# Patient Record
Sex: FEMALE | Race: WHITE
[De-identification: names, ages, dates, MRNs, and addresses within clinical notes are randomized per-mention and may not be internally consistent; named-entity substitution may affect disease eponyms.]

---

## 2015-03-05 VITALS
SYSTOLIC BLOOD PRESSURE: 103 MMHG | SYSTOLIC BLOOD PRESSURE: 103 MMHG | DIASTOLIC BLOOD PRESSURE: 63 MMHG | DIASTOLIC BLOOD PRESSURE: 63 MMHG | DIASTOLIC BLOOD PRESSURE: 63 MMHG | SYSTOLIC BLOOD PRESSURE: 103 MMHG | SYSTOLIC BLOOD PRESSURE: 103 MMHG | DIASTOLIC BLOOD PRESSURE: 63 MMHG

## 2017-04-06 ENCOUNTER — HOSPITAL ENCOUNTER (OUTPATIENT)
Dept: HOSPITAL 61 - KCIC CT | Age: 52
Discharge: HOME | End: 2017-04-06
Attending: NURSE PRACTITIONER
Payer: OTHER GOVERNMENT

## 2017-04-06 DIAGNOSIS — R19.4: Primary | ICD-10-CM

## 2017-04-06 DIAGNOSIS — Z85.048: ICD-10-CM

## 2017-04-06 PROCEDURE — 74178 CT ABD&PLV WO CNTR FLWD CNTR: CPT

## 2017-04-06 NOTE — [facility]
PROCEDURE 

CT abdomen pelvis with and without contrast. 

 

HISTORY 

History of rectal cancer, bowel changes. Cramping pain. 

 

TECHNIQUE 

Helical CT imaging of the abdomen and pelvis is performed after oral 

contrast and before and after 89 cc Omnipaque 300 IV contrast. 

PQRS: One or more the following individualized dose reduction techniques 

were utilized for the study: 

1. Automated exposure control. 

2. Adjustment of the mA and/or kV according to patient size. 

3. Use of iterative reconstruction technique. 

 

COMPARISON 

CT chest abdomen pelvis with contrast, August 29, 2016. 

 

FINDINGS 

Lung bases are clear. Cardiac size normal. 

 

Small hypodensity in segment 3 of the liver is stable and may be a cyst or

hemangioma. Tiny hypodensity in segment 4B is slightly larger although 

this may be due to slice acquisition. Tiny hypodensity in segment 5 

adjacent to the gallbladder is stable. Liver otherwise homogeneous. 

 

The spleen, gallbladder, pancreas, adrenal glands, abdominal aorta, and 

kidneys are normal. 

 

Stomach unremarkable. No dilated small bowel. No colon wall thickening is 

seen. The appendix diameter is prominent but similar to prior study. There

is no surrounding inflammation. No abdominal adenopathy or free fluid. 

 

Urinary bladder is normal. Uterus unremarkable. Prominent left periuterine

vessels are similar. No pelvic free fluid. 

No acute bone abnormality. 

 

IMPRESSION 

No acute abdominal or pelvic abnormality. 

 

 

Electronically signed by: Chris Wright MD (Apr 06, 2017 14:43:59)

## 2018-12-17 ENCOUNTER — HOSPITAL ENCOUNTER (OUTPATIENT)
Dept: HOSPITAL 61 - CT | Age: 53
Discharge: HOME | End: 2018-12-17
Attending: RADIOLOGY
Payer: OTHER GOVERNMENT

## 2018-12-17 DIAGNOSIS — Z85.828: ICD-10-CM

## 2018-12-17 DIAGNOSIS — K76.89: ICD-10-CM

## 2018-12-17 DIAGNOSIS — J43.2: Primary | ICD-10-CM

## 2018-12-17 PROCEDURE — 74177 CT ABD & PELVIS W/CONTRAST: CPT

## 2018-12-17 PROCEDURE — 71260 CT THORAX DX C+: CPT

## 2018-12-17 NOTE — RAD
CT CHEST ABD PELVIS W/CONTRAST 

 

Indication: Squamous cell cancer

 

Technique: Postcontrast CT imaging was performed of the chest, abdomen, 

pelvis, multiplanar reconstruction images submitted. One or more of the 

following individualized dose reduction techniques were utilized for this 

examination:  1. Automated exposure control  2. Adjustment of the mA 

and/or kV according to patient size  3. Use of iterative reconstruction 

technique.

 

Comparison: August 29, 2016 chest abdomen pelvis and April 6, 2017 exam of

the abdomen and pelvis

 

CHEST:

 

Findings:  

Thoracic aortic caliber is stable, no dissection flap. There are bilateral

breast implants. There is no pericardial or pleural fluid, pneumothorax, 

infiltrate, suspicious pulmonary nodularity. There is no new significant 

lymphadenopathy of the chest. No new suspicious bone lesion is identified.

There is mild centrilobular emphysema, upper zone predominance.

 

IMPRESSION:

1.  No new abnormality is identified of the chest.

 

Abdomen pelvis:

 

FINDINGS: There is stable tiny hypodense lesion of the left lobe of the 

liver laterally about 0.4 cm. Another small hypodense lesion of the left 

lobe of the liver axial image 28 series 4 at 0.4 cm in greatest size is 

somewhat larger as previously 0.2 cm on the 2016 exam, otherwise difficult

to characterize given small size. No new liver lesion is identified. Both 

kidneys enhance, no hydronephrosis. There is no adrenal nodularity. There 

is no new abnormality of the pancreas or spleen. Appearance of gastric 

wall thickening may be accentuated by incomplete distention during exam. 

Bowel is not significantly dilated. There is no free air or free fluid. 

There is no new significant lymphadenopathy. No new suspicious bone lesion

is identified.

 

IMPRESSION:

 

1. There are 2 small hypodense foci of the liver too small to accurately 

characterize, one of which is larger than 2016 exam up to 0.4 cm. No new 

liver lesion or lymphadenopathy is identified.

 

Electronically signed by: Noe Loyola MD (12/17/2018 4:49 PM) 

[facility]

## 2018-12-31 ENCOUNTER — HOSPITAL ENCOUNTER (OUTPATIENT)
Dept: HOSPITAL 61 - MRI | Age: 53
Discharge: HOME | End: 2018-12-31
Attending: INTERNAL MEDICINE
Payer: OTHER GOVERNMENT

## 2018-12-31 DIAGNOSIS — K76.89: Primary | ICD-10-CM

## 2018-12-31 DIAGNOSIS — Z85.048: ICD-10-CM

## 2018-12-31 PROCEDURE — 74183 MRI ABD W/O CNTR FLWD CNTR: CPT

## 2018-12-31 NOTE — RAD
MRI abdomen without with contrast 12/31/2018

 

Clinical indications: Liver lesions, history of anorectal carcinoma.

 

COMPARISON: CT abdomen and pelvis 12/17/2018, 8/29/2016, 9/24/2015, 

11/11/2014.

 

TECHNIQUE: Multisequence, multiplanar MR imaging of the abdomen was 

obtained without and following the intravenous administration of 6 mL 

Gadavist gadolinium-based contrast material.

 

FINDINGS:

 

Lower thorax: Partial visualization of bilateral breast implants. Heart 

size is normal.

 

Liver: There is a T2 hyperintense cyst measuring 0.7 cm in hepatic segment

3 series 6/image 17. There is a T2 hyperintense cyst measuring 0.6 cm in 

hepatic segment IVb series 6/image 21. Tiny, T2 hyperintense cyst in 

hepatic segment IVb series 4/image 23 measuring 0.4 cm. 0.2 cm benign cyst

in hepatic segment 2 series 4/image 12. No suspicious enhancing liver 

lesion.

 

Biliary system: Gallbladder normal in size. No intra-axial hepatic biliary

ductal dilatation.

 

Spleen: Unremarkable.

 

Adrenal glands and kidneys: Unremarkable.

 

Pancreas: Unremarkable.

 

Abdominal aorta and major vessels: Abdominal aorta normal in caliber. 

Major portal veins patent.

 

Lymph nodes: No abdominal lymphadenopathy.

 

Bowel and peritoneum: Visualized small and large bowel loops are normal in

caliber.

 

IMPRESSION:

1. Multiple subcentimeter benign hepatic cysts.

2. No hepatic metastatic disease.

 

Electronically signed by: Zeke Castro MD (12/31/2018 12:59 PM) [id number]

## 2019-08-08 ENCOUNTER — HOSPITAL ENCOUNTER (OUTPATIENT)
Dept: HOSPITAL 61 - CT | Age: 54
Discharge: HOME | End: 2019-08-08
Attending: INTERNAL MEDICINE
Payer: OTHER GOVERNMENT

## 2019-08-08 DIAGNOSIS — K76.89: ICD-10-CM

## 2019-08-08 DIAGNOSIS — Z88.5: ICD-10-CM

## 2019-08-08 DIAGNOSIS — Z92.21: ICD-10-CM

## 2019-08-08 DIAGNOSIS — J43.9: ICD-10-CM

## 2019-08-08 DIAGNOSIS — C21.0: Primary | ICD-10-CM

## 2019-08-08 PROCEDURE — 71260 CT THORAX DX C+: CPT

## 2019-08-08 PROCEDURE — 74177 CT ABD & PELVIS W/CONTRAST: CPT

## 2019-08-08 NOTE — RAD
EXAM: CT Chest, Abdomen and Pelvis with IV contrast

 

CLINICAL HISTORY: Rectal cancer, follow-up 

 

COMPARISON: MRI abdomen 12/31/2018, CT 12/17/2018

 

TECHNIQUE: Helical CT of the chest, abdomen and pelvis was performed 

following the administration of intravenous contrast. Axial, coronal and 

sagittal reformatted images were generated.

 

---PQRS compliance statement - One or more of the following individualized

dose reduction techniques were utilized for this study:

1.  Automated exposure control

2.  Adjustment of the mA and/or kV according to patient size

3.  Use of iterative reconstruction technique---

 

FINDINGS: 

 

Chest: 

No lobar consolidation. No suspicious lung nodule or mass. Central airways

are grossly patent. Mild emphysematous changes are seen.

 

No axillary lymphadenopathy. No mediastinal or hilar lymphadenopathy.

 

No pleural effusion or pneumothorax.

 

Heart is not enlarged. No pericardial effusion. Bilateral breast implants 

are seen.

 

Abdomen and Pelvis: 

Hepatic hypodense lesions were previously described as cysts. No definite 

new hepatic lesion is seen. Gallbladder is normal. No biliary ductal 

dilatation. Pancreas is unremarkable. Spleen is normal in appearance. 

Adrenal glands are normal.

 

Symmetric nephrograms. No focal renal lesion. No hydronephrosis. No 

hydroureter. Bladder is unremarkable.

 

Moderate to large volume colonic stool content is seen. No small or large 

bowel dilatation. No evidence for bowel obstruction.

 

No abdominal or pelvic lymphadenopathy. No abdominal or pelvic ascites.

 

Aorta is normal in caliber.

 

Bones: 

Osseous structures are grossly stable

 

IMPRESSION:

No thoracic, abdominal or pelvic lymphadenopathy. No definite focus of 

recurrent malignancy or metastasis are convincingly identified.

 

Electronically signed by: Tim Baez MD (8/8/2019 11:21 AM) [id number]